# Patient Record
Sex: MALE | Race: BLACK OR AFRICAN AMERICAN | NOT HISPANIC OR LATINO | Employment: FULL TIME | URBAN - METROPOLITAN AREA
[De-identification: names, ages, dates, MRNs, and addresses within clinical notes are randomized per-mention and may not be internally consistent; named-entity substitution may affect disease eponyms.]

---

## 2019-02-02 ENCOUNTER — HOSPITAL ENCOUNTER (EMERGENCY)
Facility: CLINIC | Age: 29
Discharge: HOME | End: 2019-02-02
Admitting: PHYSICIAN ASSISTANT

## 2019-02-02 VITALS
OXYGEN SATURATION: 99 % | HEIGHT: 72 IN | BODY MASS INDEX: 27.77 KG/M2 | RESPIRATION RATE: 16 BRPM | HEART RATE: 103 BPM | TEMPERATURE: 98.4 F | DIASTOLIC BLOOD PRESSURE: 73 MMHG | WEIGHT: 205 LBS | SYSTOLIC BLOOD PRESSURE: 127 MMHG

## 2019-02-02 DIAGNOSIS — B96.89 BACTERIAL CONJUNCTIVITIS OF BOTH EYES: ICD-10-CM

## 2019-02-02 DIAGNOSIS — J06.9 VIRAL URI WITH COUGH: ICD-10-CM

## 2019-02-02 DIAGNOSIS — H10.9 BACTERIAL CONJUNCTIVITIS OF BOTH EYES: ICD-10-CM

## 2019-02-02 LAB
DEPRECATED S PYO AG THROAT QL EIA: NORMAL
SPECIMEN SOURCE: NORMAL

## 2019-02-02 PROCEDURE — 87880 STREP A ASSAY W/OPTIC: CPT | Performed by: PHYSICIAN ASSISTANT

## 2019-02-02 PROCEDURE — 99283 EMERGENCY DEPT VISIT LOW MDM: CPT

## 2019-02-02 PROCEDURE — 25000132 ZZH RX MED GY IP 250 OP 250 PS 637: Performed by: PHYSICIAN ASSISTANT

## 2019-02-02 PROCEDURE — 87081 CULTURE SCREEN ONLY: CPT | Performed by: PHYSICIAN ASSISTANT

## 2019-02-02 RX ORDER — POLYMYXIN B SULFATE AND TRIMETHOPRIM 1; 10000 MG/ML; [USP'U]/ML
1-2 SOLUTION OPHTHALMIC EVERY 6 HOURS
Qty: 3 ML | Refills: 0 | Status: SHIPPED | OUTPATIENT
Start: 2019-02-02 | End: 2019-02-09

## 2019-02-02 RX ORDER — BENZONATATE 100 MG/1
100 CAPSULE ORAL 3 TIMES DAILY PRN
Qty: 20 CAPSULE | Refills: 0 | Status: SHIPPED | OUTPATIENT
Start: 2019-02-02 | End: 2019-02-09

## 2019-02-02 RX ORDER — IBUPROFEN 600 MG/1
600 TABLET, FILM COATED ORAL ONCE
Status: COMPLETED | OUTPATIENT
Start: 2019-02-02 | End: 2019-02-02

## 2019-02-02 RX ORDER — ACETAMINOPHEN 500 MG
1000 TABLET ORAL ONCE
Status: COMPLETED | OUTPATIENT
Start: 2019-02-02 | End: 2019-02-02

## 2019-02-02 RX ADMIN — IBUPROFEN 600 MG: 600 TABLET ORAL at 20:40

## 2019-02-02 RX ADMIN — ACETAMINOPHEN 1000 MG: 500 TABLET, FILM COATED ORAL at 20:40

## 2019-02-02 ASSESSMENT — ENCOUNTER SYMPTOMS
EYE PAIN: 1
EYE REDNESS: 1
SORE THROAT: 1
EYE DISCHARGE: 1
VOICE CHANGE: 1
COUGH: 1
HEADACHES: 1
CHILLS: 1
FEVER: 0

## 2019-02-02 ASSESSMENT — MIFFLIN-ST. JEOR: SCORE: 1947.87

## 2019-02-02 NOTE — LETTER
February 2, 2019      To Whom It May Concern:      Neymar Gilmore was seen in our Emergency Department today, 02/02/19.  I expect his condition to improve over the next 2 days.  He may return to work/school when improved.    Sincerely,        FARIHA Wall PA-C

## 2019-02-02 NOTE — ED AVS SNAPSHOT
[facility] Emergency Department  [address]  [facility] [address]  Phone:  [phone]  Fax:  [phone]                                    Neymar Gilmore   MRN: [id number]    Department:  [facility] Emergency Department   Date of Visit:  2/2/2019           After Visit Summary Signature Page    I have received my discharge instructions, and my questions have been answered. I have discussed any challenges I see with this plan with the nurse or doctor.    ..........................................................................................................................................  Patient/Patient Representative Signature      ..........................................................................................................................................  Patient Representative Print Name and Relationship to Patient    ..................................................               ................................................  Date                                   Time    ..........................................................................................................................................  Reviewed by Signature/Title    ...................................................              ..............................................  Date                                               Time          22EPIC Rev 08/18

## 2019-02-03 NOTE — DISCHARGE INSTRUCTIONS
"Discharge Instructions  Conjunctivitis  Conjunctivitis, or \"pinkeye\", is inflammation of the conjunctiva, which is the thin membrane that lines the inner surface of the eyelids and the whites of the eyes.   There are four main types of conjunctivitis: viral, bacterial, allergic, and non-specific. Both bacterial and viral conjunctivitis spread easily from one person to another by contact with the eye or another person?s hands, by an object the infected person has touched (such as a door handle), or by sharing an object that has touched their eye (such as a towel or pillowcase). Because of this, children with bacterial conjunctivitis cannot go back to school or  until they have been on antibiotics for 24 hours.  Generally, every Emergency Department visit should have a follow-up clinic visit with either a primary or a specialty clinic/provider. Please follow-up as instructed by your emergency provider today.  VIRAL CONJUNCTIVITIS: The virus that causes the common cold and is often seen as part of a general cold typically causes this type of conjunctivitis.  This type of conjunctivitis is not treated with antibiotics, and usually lasts 3 - 5 days.  An over-the-counter antihistamine/decongestant eye drop may help to relieve the itching and irritation of viral conjunctivitis.  BACTERIAL CONJUNCTIVITIS:  This is treated with an antibiotic ointment or eye drop.  In both bacterial and viral conjunctivitis, do not wear contact lenses until your eye is no longer red.   Your contact case should be thrown away and the contacts disinfected overnight, or replaced if disposable.  NON-SPECIFIC CONJUNCTIVITIS: Sometimes a red eye is caused by other things such as dry eye, chemical exposure, or foreign body in the eye such as dust or eyelash.   All of these problems generally improve on their own within 24 hours.  ALLERGIC CONJUNCTIVITIS: These are eye symptoms caused by allergies. This type of conjunctivitis will be treated " with allergy medications.    Return to the Emergency Department if:  If you have blurry vision.  If you have increasing eye pain or drainage.  If you have new redness or swelling in the skin around the eye.  If you were given a prescription for medicine here today, be sure to read all of the information (including the package insert) that comes with your prescription.  This will include important information about the medicine, its side effects, and any warnings that you need to know about.  The pharmacist who fills the prescription can provide more information and answer questions you may have about the medicine.  If you have questions or concerns that the pharmacist cannot address, please call or return to the Emergency Department.   Remember that you can always come back to the Emergency Department if you are not able to see your regular provider in the amount of time listed above, if you get any new symptoms, or if there is anything that worries you.  Discharge Instructions  Upper Respiratory Infection    The upper respiratory tract includes the sinuses, nasal passages, pharynx, and larynx. A URI, or upper respiratory infection, is an infection of any of the parts of the upper airway. Symptoms include runny nose, congestion, sneezing, sore throat, cough, and fever. URIs are almost always caused by a virus. Antibiotics do not help with viral infections, so are generally not prescribed. A URI is very contagious through coughing and nasal secretions; make sure you wash your hands often and clean surfaces after sneezing, coughing or touching them. While you should start to improve in 3 - 5 days, remember that sometimes a cough can linger for several weeks.    Generally, every Emergency Department visit should have a follow-up clinic visit with either a primary or a specialty clinic/provider. Please follow-up as instructed by your emergency provider today.    Return to the Emergency Department if:  Any of your  symptoms get much worse.  You seem very sick, like being too weak to get up.  You have chest pain or shortness of breath.   You have a severe headache.  You are vomiting (throwing up) so much you cannot keep fluids or medicines down.  You have confusion or seem unusually drowsy.  You have a seizure.    What can I do to help myself?  Fill any prescriptions the provider gave you and take them right away  If you have a fever, get plenty of rest and drink lots of fluids, especially water.  Using a humidifier or saline nose spray will also help loosen mucous.   Clothes or blankets will not change your fever. Do what is comfortable for you.  Bathing or sponging in lukewarm water may help you feel better.  Acetaminophen (Tylenol ) or ibuprofen (Advil , Motrin ) will help bring fever down and may help you feel more comfortable. Be sure to read and follow the package directions, and ask your provider if you have questions.  Do not drink alcohol.  Decongestants may help you feel better. You may use decongestant nose sprays Afrin  (oxymetazoline) or Evelio-Synephrine  (phenylephrine hydrochloride) for up to 3 days, or may use a decongestant tablet like Sudafed  (pseudoephedrine).  If you were given a prescription for medicine here today, be sure to read all of the information (including the package insert) that comes with your prescription.  This will include important information about the medicine, its side effects, and any warnings that you need to know about.  The pharmacist who fills the prescription can provide more information and answer questions you may have about the medicine.  If you have questions or concerns that the pharmacist cannot address, please call or return to the Emergency Department.   Remember that you can always come back to the Emergency Department if you are not able to see your regular provider in the amount of time listed above, if you get any new symptoms, or if there is anything that worries  you.

## 2019-02-03 NOTE — ED TRIAGE NOTES
Pt in with C/O URI sx. Pt reports sore throat, itchy red watery  eyes and runny nose. ABCDs intact.

## 2019-02-03 NOTE — ED PROVIDER NOTES
History     Chief Complaint:  URI      HPI   Neymar Gilmore is a 28 year old male who presents to the emergency department for evaluation of a URI. The patient reports that three days ago he had the onset of left eye drainage and redness. The next day, the symptoms spread to both of his eyes and he developed a sore throat, mucous producing cough, intermittent headache, chills, and had lost his voice. He does not wear contact lenses, and denies foreign body sensation.  He reports redness and purulent drainage bilaterally from eyes but does not really have pain in the eyes.  The patient denies fever, ear pain, or use of any medications.  No vomiting, chest pain, shortness of breath, neck stiffness or rashes.  Patient notes that he does smoke cigarettes.    Allergies:  No Known Drug Allergies    Medications:    The patient is not currently taking any prescribed medications.    Past Medical History:    Asthma   Eczema     Past Surgical History:    History reviewed. No pertinent past surgical history.    Family History:    History reviewed. No pertinent family history.    Social History:  Occupation: "[employer]  Current smoker: couple times a week.     Review of Systems   Constitutional: Positive for chills. Negative for fever.   HENT: Positive for sore throat and voice change. Negative for ear pain.    Eyes: Positive for pain, discharge and redness.   Respiratory: Positive for cough.    Neurological: Positive for headaches.   All other systems reviewed and are negative.     Physical Exam     Patient Vitals for the past 24 hrs:   BP Temp Temp src Pulse Resp SpO2 Height Weight   02/02/19 2009 127/73 98.4  F (36.9  C) Oral 103 16 99 % 1.829 m (6') 93 kg (205 lb)     Physical Exam  General: Alert and cooperative with exam. Resting comfortably on gurney  Head:  Scalp is NC/AT  Eyes:  Conjunctiva injected bilaterally with crusting of lashes, PERRL,     EOMI without pain.  ENT:  The external nose and ears are normal.     The  oropharynx is mildly erythematous without tonsillar swelling. Uvula midline, no submandibular swelling, sublingual swelling, trismus.    TM's normal bilaterally  Neck:  Normal range of motion without rigidity.  CV:  Regular rate and rhythm    No pathologic murmur, rubs, or gallops.  Resp:  Breath sounds are clear bilaterally.  No crackles, wheezes, rhonchi.    Non-labored, no retractions or accessory muscle use  GI:  Abdomen is soft, no distension, no tenderness, no masses. No peritoneal signs.    Bowel sounds present in all quadrants  MS:  No lower extremity edema or asymmetric calf swelling.     Skin:  Warm and dry, No rash or lesions noted.  Neuro: Oriented x 3. No gross motor deficits.  Psych:  Awake. Alert. Normal affect. Appropriate interactions.  Lymph: No anterior or posterior cervical lymphadenopathy noted.    Emergency Department Course   Laboratory:  Rapid strep screen: Negative  Beta strep group A culture: In process    Interventions:  2040 Tylenol 1000 mg tablet PO  2040 Advil 600 mg tablet PO    Emergency Department Course:  2032 Nursing notes and vitals reviewed. I performed an exam of the patient as documented above.     Medicine administered as documented above. Throat swabbed. This was sent to the lab for further testing, results above.    2054 I rechecked the patient and discussed the results of his workup thus far.     Findings and plan explained to the Patient. Patient discharged home with instructions regarding supportive care, medications, and reasons to return. The importance of close follow-up was reviewed. The patient was prescribed Tessalon and Polytrim.    I personally reviewed the laboratory results with the Patient and answered all related questions prior to discharge.     Impression & Plan    Medical Decision Making:  Neymar Gilmore is a 28 year old male who presents for evaluation of cough, sore throat, and eye redness and drainage.  This is consistent with an upper respiratory tract  infection, with evidence of bilateral conjunctivitis.  Suspect bacterial conjunctivitis likely given purulent drainage.  No clinical evidence of of more serious etiology such as orbital or periorbital cellulitis, acute glaucoma, keratitis, ulcer, foreign body etc.  Patient is not a contact lens wearer.  There is no signs at this point of serious bacterial infection such as OM, RPA, epiglottitis, PTA, strep pharyngitis, pneumonia, sinusitis, meningitis, bacteremia, serious bacterial infection.  Rapid strep negative.  Given clear lungs, fever, no hypoxia and no respiratory distress I do not feel he needs a CXR at this point as the probability of bacterial pneumonia is very unlikely.  There are no gastrointestinal symptoms at this point and no signs of dehydration.  Close follow up with primary care physician is indicated.  Return to ED for fever > 102, protracted vomiting, confusion.      Diagnosis:    ICD-10-CM    1. Bacterial conjunctivitis of both eyes H10.9    2. Viral URI with cough J06.9     B97.89        Disposition:  Discharged to home    Discharge Medications:     Medication List      Started    benzonatate 100 MG capsule  Commonly known as:  TESSALON  100 mg, Oral, 3 TIMES DAILY PRN     trimethoprim-polymyxin b 34061-2.1 UNIT/ML-% ophthalmic solution  Commonly known as:  POLYTRIM  1-2 drops, Both Eyes, EVERY 6 HOURS          Scribe Disclosure:  I, Omar Landis, am serving as a scribe on 2/2/2019 at 8:32 PM to personally document services performed by Joshua Mao PA-C based on my observations and the provider's statements to me.     Omar Landis  2/2/2019   [facility] EMERGENCY DEPARTMENT       Joshua Mao PA-C  02/02/19 0867

## 2019-02-04 LAB
BACTERIA SPEC CULT: NORMAL
Lab: NORMAL
SPECIMEN SOURCE: NORMAL

## 2019-02-04 NOTE — RESULT ENCOUNTER NOTE
Final Beta strep group A r/o culture is NEGATIVE for Group A streptococcus.    No treatment or change in treatment per [facility] Strep protocol.

## 2019-08-29 ENCOUNTER — HOSPITAL ENCOUNTER (EMERGENCY)
Facility: CLINIC | Age: 29
Discharge: HOME | End: 2019-08-29
Attending: EMERGENCY MEDICINE | Admitting: EMERGENCY MEDICINE
Payer: MEDICAID

## 2019-08-29 VITALS
OXYGEN SATURATION: 97 % | BODY MASS INDEX: 30.2 KG/M2 | WEIGHT: 222.66 LBS | DIASTOLIC BLOOD PRESSURE: 68 MMHG | RESPIRATION RATE: 20 BRPM | TEMPERATURE: 98.1 F | SYSTOLIC BLOOD PRESSURE: 123 MMHG

## 2019-08-29 DIAGNOSIS — J45.40 MODERATE PERSISTENT ASTHMA, UNSPECIFIED WHETHER COMPLICATED: ICD-10-CM

## 2019-08-29 PROCEDURE — 99282 EMERGENCY DEPT VISIT SF MDM: CPT

## 2019-08-29 RX ORDER — ALBUTEROL SULFATE 90 UG/1
2 AEROSOL, METERED RESPIRATORY (INHALATION) EVERY 4 HOURS PRN
Qty: 1 INHALER | Refills: 0 | Status: SHIPPED | OUTPATIENT
Start: 2019-08-29 | End: 2022-03-21

## 2019-08-29 RX ORDER — INHALER, ASSIST DEVICES
SPACER (EA) MISCELLANEOUS
Qty: 1 EACH | Refills: 0 | Status: SHIPPED | OUTPATIENT
Start: 2019-08-29 | End: 2022-03-21

## 2019-08-29 RX ORDER — PREDNISONE 20 MG/1
TABLET ORAL
Qty: 10 TABLET | Refills: 0 | Status: SHIPPED | OUTPATIENT
Start: 2019-08-29 | End: 2022-03-21

## 2019-08-29 SDOH — HEALTH STABILITY: MENTAL HEALTH: HOW OFTEN DO YOU HAVE A DRINK CONTAINING ALCOHOL?: NEVER

## 2019-08-29 ASSESSMENT — ENCOUNTER SYMPTOMS
FEVER: 0
COUGH: 0
SHORTNESS OF BREATH: 1
WHEEZING: 1

## 2019-08-29 NOTE — LETTER
August 29, 2019      To Whom It May Concern:      Neymar Gilmore was seen in our Emergency Department today, 08/29/19.  I expect his condition to improve over the next 2 days.  He may return to work/school when improved.    Sincerely,        Meggan Luna MD

## 2019-08-29 NOTE — DISCHARGE INSTRUCTIONS
Discharge Instructions  Asthma    Asthma is a condition causing narrowing and inflammation of the airways that can make it hard to breathe.  Asthma can also cause cough, wheezing, noisy breathing and tightness in the chest.  Asthma can be brought on or  triggered  by many things, including dust, mold, pollen, cigarette smoke, exercise, stress and infections (like the common cold).     Generally, every Emergency Department visit should have a follow-up clinic visit with either a primary or a specialty clinic/provider. Please follow-up as instructed by your emergency provider today.    Return to the Emergency Department if:  Your breathing gets worse.  You need to use your inhaler more often than every 4 hours, or cannot get relief from your inhaler.  You are very weak, or feel much more ill.  You develop new symptoms, such as chest pain.  You cough up blood.  You are vomiting (throwing up) enough that you cannot keep fluids or your medicine down.    What can I do to help myself?  Fill any prescriptions the provider gave you and take them right away--especially antibiotics. Be sure to finish the whole antibiotic prescription.  You may be given a prescription for an inhaler, which can help loosen tight air passages.  Use this as needed, but not more often than directed. Inhalers work much better when used with a spacer.   You may be given a prescription for a steroid to reduce inflammation. Used long-term, these can have some side effects, but for short-term use they are safe. You may notice restlessness or increased appetite (eating more).      You may use non-prescription cough or cold medicines. Cough medicines may help, but do not make the cough go away completely.   Avoid smoke, because this can make you feel worse. If you smoke, this may be a good time to quit! Consider using nicotine lozenges, gum, or patches to reduce cravings.   If you have a fever, Tylenol  (acetaminophen), Motrin  (ibuprofen), or Advil   (ibuprofen), may help bring fever down and may help you feel more comfortable. Be sure to read and follow the package directions, and ask your provider if you have questions.  Be sure to get your flu shot each year.  For certain age groups, the pneumonia shot can help prevent pneumonia.  It is important that you follow up with your regular provider, to be sure that you are improving from this spell (an acute asthma exacerbation), and also to do what you can to keep from having trouble again. Sometimes you need long-term medicines to keep your asthma under control.   If you were given a prescription for medicine here today, be sure to read all of the information (including the package insert) that comes with your prescription.  This will include important information about the medicine, its side effects, and any warnings that you need to know about.  The pharmacist who fills the prescription can provide more information and answer questions you may have about the medicine.  If you have questions or concerns that the pharmacist cannot address, please call or return to the Emergency Department.   Remember that you can always come back to the Emergency Department if you are not able to see your regular provider in the amount of time listed above, if you get any new symptoms, or if there is anything that worries you.

## 2019-08-29 NOTE — ED NOTES
Pt denies feeling wheezy and denies pain/SOB at the moment, but says that his supervisor instructed him to be evaluated because he becomes dyspneic at work.

## 2019-08-29 NOTE — ED AVS SNAPSHOT
[facility] Emergency Department  [address]  [facility] [address]  Phone:  [phone]  Fax:  [phone]                                    Neymar Gilmore   MRN: [id number]    Department:  [facility] Emergency Department   Date of Visit:  8/29/2019           After Visit Summary Signature Page    I have received my discharge instructions, and my questions have been answered. I have discussed any challenges I see with this plan with the nurse or doctor.    ..........................................................................................................................................  Patient/Patient Representative Signature      ..........................................................................................................................................  Patient Representative Print Name and Relationship to Patient    ..................................................               ................................................  Date                                   Time    ..........................................................................................................................................  Reviewed by Signature/Title    ...................................................              ..............................................  Date                                               Time          22EPIC Rev 08/18

## 2019-08-29 NOTE — ED PROVIDER NOTES
"  History     Chief Complaint:  Shortness of Breath      HPI   Neymar Gilmore is a 28 year old male with a history of asthma who presents with shortness of breath. The patient says that he has been having some wheezing at night and while at work. 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 The patient notes that the wheezing happens more often at night than at work and it can get bad enough that it's \"basically a whistle\". The patient says that he has not had an inhaler to treat his asthma in years. He denies any fever, cough or congestion. The patient says that he is feeling fine upon examination.       Allergies:  No Known Drug Allergies     Medications:    Medications reviewed. No pertinent medications.     Past Medical History:    GERD  Asthma  Throat irritation  Conjunctivitis   Cellulitis  Exercise induced bronchospasm  Eczema     Past Surgical History:    Surgical history reviewed. No pertinent surgical history.     Family History:    Family history reviewed. No pertinent family history.     Social History:  Smoking Status: Former Smoker  Alcohol Use: Negative  Drug Use: Negative  Marital Status:  Single     Review of Systems   Constitutional: Negative for fever.   HENT: Negative for congestion.    Respiratory: Positive for shortness of breath and wheezing. Negative for cough.    All other systems reviewed and are negative.        Physical Exam     Patient Vitals for the past 24 hrs:   BP Temp Temp src Heart Rate Resp SpO2 Weight   08/29/19 0341 123/68 98.1  F (36.7  C) Oral 89 20 97 % 101 kg (222 lb 10.6 oz)        Physical Exam  Gen: alert  HEENT: PERRL, oropharynx clear  Neck: normal ROM  CV: RRR, no murmurs, 2+ distal pulses in all 4 extremities  Chest: no tenderness over the chest wall  Pulm: breath sounds equal, lungs clear. slightly prolonged aspiratory phase  Abd: Soft, nontender  Back: no evidence of injury  MSK: no lower extremity edema, no calf tenderness  Skin: no rash  Neuro: alert, " appropriate conversation and interaction       Emergency Department Course     Emergency Department Course:    0357 Nursing notes and vitals reviewed.    0358 I performed an exam of the patient as documented above.     0412 The patient is discharged to home.       Impression & Plan      Medical Decision Making:  Neymar Gilmore is a 28 year old male  who presents for evaluation of shortness of breath and wheezing as detailed above. Signs and symptoms are consistent with an asthma exacerbation. A broad differential was considered including foreign body, asthma, pneumonia, bronchitis, reactive airway disease, pneumothorax, cardiac equivalent, viral induced wheezing, allergic phenomena, etc. Symptoms consistent with recurrence of previously diagnosed asthma. There are no signs at this point of any serious etiologies including those mentioned above. No indication for hospitalization at this time including no hypoxia, no marked increase in respiratory rate, minimal to no retractions. Supportive outpatient management is indicated, medications for discharge noted below. Close followup with primary care physician. Return for increased wheezing, progressive shortness of breath, develops fever.      Diagnosis:    ICD-10-CM    1. Moderate persistent asthma, unspecified whether complicated J45.40      Disposition:   Findings and plan explained to the Patient. Patient discharged home with instructions regarding supportive care, medications, and reasons to return. The importance of close follow-up was reviewed.     Discharge Medications:     Review of your medicines      START taking      Dose / Directions   albuterol 108 (90 Base) MCG/ACT inhaler  Commonly known as:  PROAIR HFA      Dose:  2 puff  Inhale 2 puffs into the lungs every 4 hours as needed for shortness of breath / dyspnea  Quantity:  1 Inhaler  Refills:  0     predniSONE 20 MG tablet  Commonly known as:  DELTASONE      Take two tablets (= 40mg) each day for 5 (five)  days  Quantity:  10 tablet  Refills:  0     spacer holding chamber      Spacer- use with albuterol inhaler  Quantity:  1 each  Refills:  0           Where to get your medicines      Some of these will need a paper prescription and others can be bought over the counter. Ask your nurse if you have questions.    Bring a paper prescription for each of these medications    albuterol 108 (90 Base) MCG/ACT inhaler    predniSONE 20 MG tablet    spacer holding chamber         Scribe Disclosure:  I, Isak Box, am serving as a scribe at 3:57 AM on 8/29/2019 to document services personally performed by Meggan Luna based on my observations and the provider's statements to me.      [facility] EMERGENCY DEPARTMENT       Meggan Luna MD  08/29/19 [id number]

## 2020-03-16 ENCOUNTER — HOSPITAL ENCOUNTER (EMERGENCY)
Facility: CLINIC | Age: 30
Discharge: HOME | End: 2020-03-16
Attending: EMERGENCY MEDICINE | Admitting: EMERGENCY MEDICINE
Payer: COMMERCIAL

## 2020-03-16 VITALS
DIASTOLIC BLOOD PRESSURE: 90 MMHG | RESPIRATION RATE: 18 BRPM | OXYGEN SATURATION: 98 % | SYSTOLIC BLOOD PRESSURE: 142 MMHG | TEMPERATURE: 98.3 F

## 2020-03-16 DIAGNOSIS — K21.9 GASTROESOPHAGEAL REFLUX DISEASE, ESOPHAGITIS PRESENCE NOT SPECIFIED: ICD-10-CM

## 2020-03-16 PROCEDURE — 99283 EMERGENCY DEPT VISIT LOW MDM: CPT

## 2020-03-16 PROCEDURE — 25000132 ZZH RX MED GY IP 250 OP 250 PS 637: Performed by: EMERGENCY MEDICINE

## 2020-03-16 PROCEDURE — 25000125 ZZHC RX 250: Performed by: EMERGENCY MEDICINE

## 2020-03-16 RX ORDER — FAMOTIDINE 20 MG/1
20 TABLET, FILM COATED ORAL ONCE
Status: COMPLETED | OUTPATIENT
Start: 2020-03-16 | End: 2020-03-16

## 2020-03-16 RX ADMIN — LIDOCAINE HYDROCHLORIDE 30 ML: 20 SOLUTION ORAL; TOPICAL at 02:38

## 2020-03-16 RX ADMIN — OMEPRAZOLE 20 MG: 20 CAPSULE, DELAYED RELEASE ORAL at 02:37

## 2020-03-16 RX ADMIN — FAMOTIDINE 20 MG: 20 TABLET ORAL at 02:37

## 2020-03-16 ASSESSMENT — ENCOUNTER SYMPTOMS
SORE THROAT: 1
ROS GI COMMENTS: HEART BURN
FEVER: 0
DIFFICULTY URINATING: 0
COUGH: 0

## 2020-03-16 NOTE — LETTER
March 16, 2020      To Whom It May Concern:      Neymar Gilmore was seen in our Emergency Department today, 03/16/20.  I expect his condition to improve over the next 1 days.  He may return to work/school when improved.    Sincerely,        Rolo Gamboa MD

## 2020-03-16 NOTE — ED TRIAGE NOTES
Pt states increased acid reflux for 2 weeks. States was on an unknown proton pump inhibitor but has been out for over 2 weeks. ABCs intact GCS 15

## 2020-03-16 NOTE — ED PROVIDER NOTES
History     Chief Complaint:  Heartburn       HPI   Neymar Gilmore is a 29 year old male with a history of GERD who presents with heartburn. The patient reports having his typical acid reflux discomfort and sore throat for the past 2 weeks. The patient does eat spicy foods and was taking omeprazole for his GERD, but he has been out of the omeprazole for the past 2 weeks. 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 He denies any fever, cough, chest pain, or difficulty urinating.   Symptoms constant.  No modifying factors.  No abdominal pain or epigastric pain.  No known ulcer history.    Allergies:  No Known Allergies     Medications:    Medications reviewed. No current medications.      Past Medical History:    Gastroesophageal reflux disease   Uncomplicated asthma     Past Surgical History:    Surgical history reviewed. No pertinent surgical history.    Family History:    Family history reviewed. No pertinent family history.      Social History:  Smoking Status: Never Smoker  Alcohol Use: no  PCP: Patel, [facility]     Review of Systems   Constitutional: Negative for fever.   HENT: Positive for sore throat.    Respiratory: Negative for cough.    Cardiovascular: Negative for chest pain.   Gastrointestinal:        Heart burn   Genitourinary: Negative for difficulty urinating.   All other systems reviewed and are negative.      Physical Exam     Patient Vitals for the past 24 hrs:   BP Temp Temp src Heart Rate Resp SpO2   03/16/20 0222 (!) 142/90 98.3  F (36.8  C) Temporal 71 18 98 %        Physical Exam  I have reviewed the triage vital signs    Constitutional: Appears stated age  Eyes: No discharge, symmetrical lids  ENT: Moist mucous membranes, no ear discharge  Neck: Full range of motion  Respiratory: CTAB, no wheezes  Cardiovascular: Regular rate and rhythm, no lower extremity edema  Chest: Equal rise  Gastrointestinal: Soft. Nondistended. NTTP. No rebound or  guarding  Musculoskeletal: No gross deformities.   Skin: Warm and well perfused. No visible rash.  Neurologic: Moves all extremities, speech fluent without dysarthria  Psychiatric: Appropriate affect, alert and interactive     Emergency Department Course     Interventions:  0238 GI cocktail 30 mL oral   0237 pepcid 20 mg oral   0237 Omeprazole 20 mg oral     Emergency Department Course:  Past medical records, nursing notes, and vitals reviewed.    0225 I performed an exam of the patient as documented above.      Findings and plan explained to the Patient. Patient discharged home with instructions regarding supportive care, medications, and reasons to return. The importance of close follow-up was reviewed. The patient was prescribed omeprazole.        Impression & Plan     Medical Decision Making:  Neymar Gilmore is a 29 year old male who presents to the emergency department today with symptoms of GERD.  Differential includes GERD, gastritis, esophagitis, PUD.  Initially ordered testing to assess for hepatitis or pancreatitis, however on discussion with patient and lack of abdominal pain, these were canceled at patient request.  Do not feel is unreasonable.  Vital signs and abdominal exam are highly reassuring as above.  No reason to suspect surgical or other emergent etiology of symptoms at this time.  Patient symptoms were controlled as above.  Will prescribe PPI as outpatient.  Advised close follow-up with PCP.  Strict return to ED precautions were given.      Discharge Diagnosis:    ICD-10-CM    1. Gastroesophageal reflux disease, esophagitis presence not specified  K21.9        Disposition:  The patient is discharged to home.    Discharge Medications:  New Prescriptions    OMEPRAZOLE (PRILOSEC) 20 MG DR CAPSULE    Take 1 capsule (20 mg) by mouth daily       Scribe Disclosure:  Cj HINSON, am serving as a scribe at 2:25 AM on 3/16/2020 to document services personally performed by Rolo Gamboa MD based on my  observations and the provider's statements to me.      3/16/2020   Rolo Gamboa MD Vo, Timothy Le, MD  03/16/20 0303

## 2020-03-18 ENCOUNTER — HOSPITAL ENCOUNTER (EMERGENCY)
Facility: CLINIC | Age: 30
Discharge: HOME | End: 2020-03-18
Attending: EMERGENCY MEDICINE | Admitting: EMERGENCY MEDICINE
Payer: COMMERCIAL

## 2020-03-18 VITALS
SYSTOLIC BLOOD PRESSURE: 127 MMHG | TEMPERATURE: 97.5 F | HEART RATE: 83 BPM | RESPIRATION RATE: 16 BRPM | BODY MASS INDEX: 28.48 KG/M2 | OXYGEN SATURATION: 99 % | WEIGHT: 210 LBS | DIASTOLIC BLOOD PRESSURE: 76 MMHG

## 2020-03-18 DIAGNOSIS — R51.9 NONINTRACTABLE HEADACHE, UNSPECIFIED CHRONICITY PATTERN, UNSPECIFIED HEADACHE TYPE: ICD-10-CM

## 2020-03-18 PROCEDURE — 99283 EMERGENCY DEPT VISIT LOW MDM: CPT

## 2020-03-18 PROCEDURE — 25000132 ZZH RX MED GY IP 250 OP 250 PS 637: Performed by: EMERGENCY MEDICINE

## 2020-03-18 RX ORDER — ACETAMINOPHEN 500 MG
1000 TABLET ORAL ONCE
Status: COMPLETED | OUTPATIENT
Start: 2020-03-18 | End: 2020-03-18

## 2020-03-18 RX ADMIN — ACETAMINOPHEN 975 MG: 325 TABLET, FILM COATED ORAL at 16:57

## 2020-03-18 ASSESSMENT — ENCOUNTER SYMPTOMS
NUMBNESS: 0
FEVER: 0
HEADACHES: 1
WEAKNESS: 0
NAUSEA: 1
VOMITING: 1
PHOTOPHOBIA: 1

## 2020-03-18 NOTE — DISCHARGE INSTRUCTIONS
Patient to drink lots of fluids.  Drink enough fluids until your urine is clear in color.    Please for the next 1 to 2 days, take a dose of Tylenol every 8 hours.  This may help stay in front of your headache.  Please avoid NSAIDs as this may exacerbate your heartburn/reflux.  The daily recommended dose of Tylenol is not to exceed 3000 mg/day.    Follow-up with your primary care provider in 2 to 3 days for recheck.    Return to ER with severe headache, vomiting, neurologic symptoms or any other concerns.    Discharge Instructions  Headache    You were seen today for a headache. Headaches may be caused by many different things such as muscle tension, sinus inflammation, anxiety and stress, having too little sleep, too much alcohol, some medical conditions or injury. You may have a migraine, which is caused by changes in the blood vessels in your head.  At this time your provider does not find that your headache is a sign of anything dangerous or life-threatening.  However, sometimes the signs of serious illness do not show up right away.      Generally, every Emergency Department visit should have a follow-up clinic visit with either a primary or a specialty clinic/provider. Please follow-up as instructed by your emergency provider today.    Return to the Emergency Department if:  You get a new fever of 100.4 F or higher.  Your headache gets much worse.  You get a stiff neck with your headache.  You get a new headache that is significantly different or worse than headaches you have had before.  You are vomiting (throwing up) and cannot keep food or water down.  You have blurry or double vision or other problems with your eyes.  You have a new weakness on one side of your body.  You have difficulty with balance which is new.  You or your family thinks you are confused.  You have a seizure.    What can I do to help myself?  Pain medications - You may take a pain medication such as Tylenol  (acetaminophen), Advil ,  Motrin  (ibuprofen) or Aleve  (naproxen).  Take a pain reliever as soon as you notice symptoms.  Starting medications as soon as you start to have symptoms may lessen the amount of pain you have.  Relaxing in a quiet, dark room may help.  Get enough sleep and eat meals regularly.  You may need to watch for certain foods or other things which may trigger your headaches.  Keeping a journal of your headaches and possible triggers may help you and your primary provider to identify things which you should avoid which may be causing your headaches.  If you were given a prescription for medicine here today, be sure to read all of the information (including the package insert) that comes with your prescription.  This will include important information about the medicine, its side effects, and any warnings that you need to know about.  The pharmacist who fills the prescription can provide more information and answer questions you may have about the medicine.  If you have questions or concerns that the pharmacist cannot address, please call or return to the Emergency Department.   Remember that you can always come back to the Emergency Department if you are not able to see your regular provider in the amount of time listed above, if you get any new symptoms, or if there is anything that worries you.

## 2020-03-18 NOTE — ED AVS SNAPSHOT
[facility] Emergency Department  [address]  [facility] [address]  Phone:  [phone]  Fax:  [phone]                                    Neymar Gilmore   MRN: [id number]    Department:  [facility] Emergency Department   Date of Visit:  3/18/2020           After Visit Summary Signature Page    I have received my discharge instructions, and my questions have been answered. I have discussed any challenges I see with this plan with the nurse or doctor.    ..........................................................................................................................................  Patient/Patient Representative Signature      ..........................................................................................................................................  Patient Representative Print Name and Relationship to Patient    ..................................................               ................................................  Date                                   Time    ..........................................................................................................................................  Reviewed by Signature/Title    ...................................................              ..............................................  Date                                               Time          22EPIC Rev 08/18

## 2020-03-18 NOTE — LETTER
March 18, 2020      To Whom It May Concern:      Neymar Gilmore was seen in our Emergency Department today, 03/18/20.  I expect his condition to improve over the next 2 days.  He may return to work/school when improved.    Sincerely,        Kathy Sheets RN

## 2020-03-18 NOTE — ED TRIAGE NOTES
Headache and no sleep for 2 days. Has tried aleve, tylenol, ibuprofen with no relief. Has been nauseated in the past but not currently. Sensitivity to light and sound.

## 2020-03-18 NOTE — ED PROVIDER NOTES
History     Chief Complaint:  Headache    HPI   Neymar Gilmore is a 29 year old male who presents with a headache. The patient states that he has had a headache for the past 3 days. He states that the headache is frontal and behind his eyes. 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 The patient states that he is new to working overnights and needs to care for his children during the day. He states that he is not getting a lot of sleep. He also states that he is not able to sleep well due to pain.The patient states that he has had some nausea, noise and light sensitivity. He also states that his urine is quite dark and notes he has not been doing his best at staying hydrated. The patient denies any numbness, weakness or fever. The patient does not have a history of migraines but notes that his mother does. The patient has been taking Advil and naproxen for pain every 8 hours without much relief. He notes that he was recently seen on 3/16 for GERD and one episode of vomiting. The patient was given Prilosec at this time and notes relief with this medication.     Allergies:  No Known Allergies     Medications:    Albuterol   Prilosec  Prednisone    Past Medical History:    GERD  Asthma     Past Surgical History:    The patient does not have any pertinent past surgical history.    Family History:    No past pertinent family history.  No family hx of cerebral aneurysm    Social History:  Smoking Status: Never Smoker  Smokeless Tobacco: Never Used  Alcohol Use: Never   Marital Status:  Single     Review of Systems   Constitutional: Negative for fever.   Eyes: Positive for photophobia.   Gastrointestinal: Positive for nausea and vomiting.   Neurological: Positive for headaches. Negative for weakness and numbness.   All other systems reviewed and are  negative.      Physical Exam     Patient Vitals for the past 24 hrs:   BP Temp Temp src Pulse Resp SpO2 Weight   03/18/20 1549 127/76 97.5  F (36.4  C) Oral 83 16 99 % 95.3 kg (210 lb)       Physical Exam  General:   Well-nourished   Speaking in full sentences  Eyes:   Conjunctiva without injection or scleral icterus   Pupils 3 mm bilaterally, reactive  ENT:   Moist mucous membranes   Nares patent   Pinnae normal    Tenderness to palpation over bilateral temples.  Neck:   Full ROM   No stiffness appreciated  Resp:   Lungs CTAB   No crackles, wheezing or audible rubs   Good air movement  CV:    Normal rate, regular rhythm   S1 and S2 present   No murmur, gallop or rub  GI:   BS present   Abdomen soft without distention   Non-tender to light and deep palpation   No guarding or rebound tenderness  Skin:   Warm, dry, well perfused   No rashes or open wounds on exposed skin  MSK:   Moves all extremities   No focal deformities or swelling  Neuro:   Alert   CN III-XII grossly intact   Answers questions appropriately   Moves all extremities equally   Gait stable  Psych:   Normal affect, normal mood    Emergency Department Course     Interventions:  1657 Tylenol 975 mg oral     Emergency Department Course:     Nursing notes and vitals reviewed.    1640 I performed an exam of the patient as documented above. I answered all questions prior to discharge.     Impression & Plan      Medical Decision Making:  Neymar Gilmore is a 29 year old male who presents to the emergency department today for evaluation of a headache.  VS on presentation are WNL.  Differential diagnosis is broad, including but not limited to tension, migraine, cluster headache, subarachnoid hemorrhage, bacterial meningitis/encephalitis, temporal arteritis, tumor/mass, CO poisoning, subdural/epidural hematoma, angle closure glaucoma, and hypertensive emergency.    Presently, sx most likely 2/2 primary headache syndrome.  Pt notes bitemporal and bilateral pain,  described almost as a vice  around his head.  Factors of poor sleep hygiene, work stress, poor hydration contributing.  No hx of head trauma.  No fevers, chills, neck stiffness, or antecedent URI sx to suggest meningitis/encephalitis.  No sudden onset, thunderclap headache suggestive of SAH.  No focal neurologic deficits suggestive of TIA/CVA.  Hx and exam less consistent with migraine (bilateral), though he does note photophobia / phonophobia.  Discussed treatment strategies moving forward including scheduled APAP over next 2 days (discussed max dose of 3000 mg/24 hrs), avoidance of NSAIDS given recent visit for reflux, ensuring adequate hydration, and sleep hygiene.  Pt felt comfortable with this plan of care.  No indication for imaging or further lab work-up at this time.  Pt will return to ER with worsening HA, vomiting, visual changes, fever, new neurologic deficits, or any other concerns.  All questions were answered prior to DC.    Diagnosis:    ICD-10-CM    1. Nonintractable headache, unspecified chronicity pattern, unspecified headache type  R51      Disposition:   Findings and plan explained to the Patient. Patient discharged home with instructions regarding supportive care, medications, and reasons to return. The importance of close follow-up was reviewed.     Scribe Disclosure:  I, Viktoria Ayala, am serving as a scribe at 4:26 PM on 3/18/2020 to document services personally performed by Dean Bess MD based on my observations and the provider's statements to me.    [facility] EMERGENCY DEPARTMENT       Dean Bess MD  03/18/20 2053

## 2022-01-30 ENCOUNTER — HOSPITAL ENCOUNTER (EMERGENCY)
Facility: CLINIC | Age: 32
Discharge: HOME | End: 2022-01-30
Attending: NURSE PRACTITIONER | Admitting: NURSE PRACTITIONER
Payer: OTHER MISCELLANEOUS

## 2022-01-30 VITALS
TEMPERATURE: 98.1 F | HEART RATE: 78 BPM | RESPIRATION RATE: 20 BRPM | SYSTOLIC BLOOD PRESSURE: 137 MMHG | OXYGEN SATURATION: 99 % | DIASTOLIC BLOOD PRESSURE: 86 MMHG

## 2022-01-30 DIAGNOSIS — S51.811A LACERATION OF RIGHT FOREARM, INITIAL ENCOUNTER: ICD-10-CM

## 2022-01-30 PROCEDURE — 12001 RPR S/N/AX/GEN/TRNK 2.5CM/<: CPT

## 2022-01-30 PROCEDURE — 250N000009 HC RX 250: Performed by: NURSE PRACTITIONER

## 2022-01-30 PROCEDURE — 99283 EMERGENCY DEPT VISIT LOW MDM: CPT

## 2022-01-30 RX ADMIN — Medication: at 22:19

## 2022-01-30 ASSESSMENT — ENCOUNTER SYMPTOMS
NUMBNESS: 0
FEVER: 0
WEAKNESS: 0
COLOR CHANGE: 0
WOUND: 1

## 2022-01-31 NOTE — ED TRIAGE NOTES
"Pt states laceration to posterior forearm that he sustained while at work tonight. Pt states arm because caught on \"safety thing\" seperating grill and fryer. ABCs intact GCS 15  "

## 2022-01-31 NOTE — ED PROVIDER NOTES
History     Chief Complaint:  Laceration      HPI   Neymar Gilmore is a 31 year old male who presents with laceration to the right forearm.  This occurred at work with a protective metal piece of equipment.  Patient has no other concerns or complaints.  Is unsure of his tetanus status.    Review of Systems   Constitutional: Negative for fever.   Skin: Positive for wound. Negative for color change.   Neurological: Negative for weakness and numbness.   All other systems reviewed and are negative.    Allergies:  Other Environmental Allergy      Medications:    albuterol (PROAIR HFA) 108 (90 Base) MCG/ACT inhaler  predniSONE (DELTASONE) 20 MG tablet  spacer (OPTICHAMBER VICKI) holding chamber        Past Medical History:      Past Medical History:   Diagnosis Date     Gastroesophageal reflux disease      Uncomplicated asthma      There are no problems to display for this patient.       Past Surgical History:    No past surgical history on file.    Family History:    No family history on file.    Social History:  Presents alone  Works    Physical Exam     Patient Vitals for the past 24 hrs:   BP Temp Temp src Pulse Resp SpO2   01/30/22 2058 137/86 98.1  F (36.7  C) Oral 78 20 99 %       Physical Exam  General: Alert, No obvious discomfort, well kept   HENT:  Normal voice, No lymphadenopathy  Eyes:  The pupils are equal, round, and reactive to light, Conjunctiva normal, No scleral icterus   Neck:  Normal range of motion  CV:  Normal Pulses, Normal cap refill  Resp:  Non-labored, No cough  MS:  2 cm laceration of right forearm, No indication for Flexor or extensor tendon injury, Normal ROM if all digits  Skin:  No rash or acute skin lesions noted  Neuro:  Speech is normal and fluent, Normal sensation  Psych: Awake. Alert.  Normal affect.  Appropriate interactions. Good eye contact      Emergency Department Course       Imaging:  No orders to display       Laboratory:  Labs Ordered and Resulted from Time of ED  Arrival to Time of ED Departure - No data to display    Procedures:  [facility] Procedure Note        Laceration Repair:    Performed by: ROSINA Franklin CNP  Authorized by: ROSINA Franklin CNP  Consent given by: Patient who states understanding of the procedure being performed after discussing the risks, benefits and alternatives.    Preparation: Patient was prepped and draped in usual sterile fashion.  Irrigation solution: saline    Body area: Right forearm  Laceration length: 2cm  Contamination: The wound is not contaminated.  Foreign bodies:none  Tendon involvement: none  Anesthesia: Topical (patient has a needle phobia and requested topical anesthesia)  Local anesthetic: LET    Debridement: none  Skin closure: Closed with 5 x 4.0 Ethilon  Technique: interrupted  Approximation: close  Approximation difficulty: simple    Patient tolerance: Patient tolerated the procedure well with no immediate complications.      Emergency Department Course:    Reviewed:  I reviewed nursing notes, vitals and past history    Assessments:   I obtained history and examined the patient as noted above and explained findings.       Interventions:  Medications   Tdap (tetanus-diphtheria-acell pertussis) (ADACEL) injection 0.5 mL (has no administration in time range)   lido-EPINEPHrine-tetracaine (LET) topical gel GEL ( Topical Given 1/30/22 2219)       Disposition:  The patient was discharged to home.    Impression & Plan      Medical Decision Making:  Findings and exam were consistent with uncomplicated laceration of right forearm. The wound was carefully evaluated and explored. Was repaired as noted above. There is no evidence at this time of bony injury, foreign body, deep space infection, or neurovascular injury. Follow up in 2-3 days time if concerns over healing. Possible complications (infection, scarring) were reviewed with the patient. The patient is to follow-up for suture removal in 10-14 days.  Indications for immediate return to ER/UR were reviewed and included but are not limited to, redness, fevers, drainage, increasing pain, high fevers, or other concerns.  Tetanus updated    Covid-19  Neymar Gilmore was evaluated during a global COVID-19 pandemic, which necessitated consideration that the patient might be at risk for infection with the SARS-CoV-2 virus that causes COVID-19.   Applicable protocols for evaluation were followed during the patient's care.   COVID-19 was considered as part of the patient's evaluation. The plan for testing is: Not indicated    Diagnosis:    ICD-10-CM    1. Laceration of right forearm, initial encounter  S51.811A        Discharge Medications:  Discharge Medication List as of 1/30/2022 10:09 PM                 Karthikeyan Moore, ROSINA CNP  01/30/22 2224

## 2022-02-10 ENCOUNTER — HOSPITAL ENCOUNTER (EMERGENCY)
Facility: CLINIC | Age: 32
Discharge: HOME | End: 2022-02-10
Attending: EMERGENCY MEDICINE | Admitting: EMERGENCY MEDICINE
Payer: COMMERCIAL

## 2022-02-10 VITALS
TEMPERATURE: 97.7 F | SYSTOLIC BLOOD PRESSURE: 124 MMHG | DIASTOLIC BLOOD PRESSURE: 81 MMHG | OXYGEN SATURATION: 99 % | HEART RATE: 63 BPM | RESPIRATION RATE: 18 BRPM

## 2022-02-10 DIAGNOSIS — Z48.02 VISIT FOR SUTURE REMOVAL: ICD-10-CM

## 2022-02-10 PROCEDURE — 999N000104 HC STATISTIC NO CHARGE

## 2022-02-10 NOTE — ED PROVIDER NOTES
History   Chief Complaint:  Suture Removal       The history is provided by the patient.      Neymar Gilmore is a 31 year old male who presents for suture removal. The patient was recently seen here on 01/30/2021 after lacerating his left forearm. He has no other concerns to report today.     Review of Systems  Positive - laceration to R forearm  Negative-redness, warmth, tenderness    Allergies:  No Known Drug Allergies    Medications:  Albuterol inhaler  Omeprazole    Past Medical History:     Asthma  GERD      Social History:  The patient was accompanied to the ED by his daughters.  Marital status:     Physical Exam     Patient Vitals for the past 24 hrs:   BP Temp Temp src Pulse Resp SpO2   02/10/22 0941 124/81 97.7  F (36.5  C) Oral 63 18 99 %       Physical Exam  General/Appearance: appears stated age, well-groomed, appears comfortable  MSK: ARREGUIN, good tone, no bony abnormality  Skin: warm and well-perfused, no rash, no edema, no ecchymosis, nl turgor, 5 sutures in R forearm  Heme: no petechia, no purpura, no active bleeding        Emergency Department Course   Procedures:  5 sutures removed from R forearm    Emergency Department Course:     Reviewed:  I reviewed nursing notes, vitals and past medical history    Assessments:  1000 I obtained history and examined the patient in ED 18 as noted above.     Disposition:  The patient was discharged to home.     Impression & Plan     Medical Decision Making:  This patient is a 31-year-old male who lacerated his forearm on the 30th.  He presents today with suture removal.  5 sutures removed.  The wound looks good and is healing well without erythema, warmth, drainage.    Diagnosis:    ICD-10-CM    1. Visit for suture removal  Z48.02        Discharge Medications:  New Prescriptions    No medications on file       This note was completed in part using Dragon voice recognition software. Although reviewed after completion, some word and grammatical errors may  occur.              Adriane León MD  02/10/22 1001

## 2022-02-10 NOTE — ED TRIAGE NOTES
Patient presents for suture removal. Had sutures placed 1/30 on right forearm here in emergency department.

## 2022-03-17 ENCOUNTER — NURSE TRIAGE (OUTPATIENT)
Dept: NURSING | Facility: CLINIC | Age: 32
End: 2022-03-17
Payer: COMMERCIAL

## 2022-03-17 NOTE — TELEPHONE ENCOUNTER
Patient is requesting a refill for acid reflux medication Omeprazole.  Patient has tried over the counter Omeprazole and it is not working.  FNA advised to contact PCP [facility] and patient agrees.    COVID 19 Nurse Triage Plan/Patient Instructions    Please be aware that novel coronavirus (COVID-19) may be circulating in the community. If you develop symptoms such as fever, cough, or SOB or if you have concerns about the presence of another infection including coronavirus (COVID-19), please contact your health care provider or visit [email or web address].     Disposition/Instructions    Home care recommended. Follow home care protocol based instructions.    Thank you for taking steps to prevent the spread of this virus.  o Limit your contact with others.  o Wear a simple mask to cover your cough.  o Wash your hands well and often.    Resources    [facility]: About COVID-19: [email or web address]    CDC: What to Do If You're Sick: www.cdc.gov/coronavirus/2019-ncov/about/steps-when-sick.html    CDC: Ending Home Isolation: www.cdc.gov/coronavirus/2019-ncov/hcp/disposition-in-home-patients.html     CDC: Caring for Someone: www.cdc.gov/coronavirus/2019-ncov/if-you-are-sick/care-for-someone.html     [facility]: Interim Guidance for Hospital Discharge to Home: www.health.[facility].[place]/diseases/coronavirus/hcp/hospdischarge.pdf    [facility] clinical trials (COVID-19 research studies): [email or web address]     Below are the COVID-19 hotlines at the [facility] of Health ([facility]). Interpreters are available.   o For health questions: Call 701-486-4753 or 1-136.873.7378 (7 a.m. to 7 p.m.)  o For questions about schools and childcare: Call 195-130-8157 or 1-385.242.6396 (7 a.m. to 7 p.m.)

## 2022-03-21 ENCOUNTER — OFFICE VISIT (OUTPATIENT)
Dept: FAMILY MEDICINE | Facility: CLINIC | Age: 32
End: 2022-03-21
Payer: COMMERCIAL

## 2022-03-21 ENCOUNTER — MYC MEDICAL ADVICE (OUTPATIENT)
Dept: FAMILY MEDICINE | Facility: CLINIC | Age: 32
End: 2022-03-21

## 2022-03-21 VITALS
HEART RATE: 76 BPM | WEIGHT: 225.5 LBS | BODY MASS INDEX: 32.28 KG/M2 | SYSTOLIC BLOOD PRESSURE: 110 MMHG | DIASTOLIC BLOOD PRESSURE: 80 MMHG | HEIGHT: 70 IN

## 2022-03-21 DIAGNOSIS — F41.1 GAD (GENERALIZED ANXIETY DISORDER): ICD-10-CM

## 2022-03-21 DIAGNOSIS — K21.00 GASTROESOPHAGEAL REFLUX DISEASE WITH ESOPHAGITIS WITHOUT HEMORRHAGE: Primary | ICD-10-CM

## 2022-03-21 DIAGNOSIS — F41.1 GAD (GENERALIZED ANXIETY DISORDER): Primary | ICD-10-CM

## 2022-03-21 PROCEDURE — 99203 OFFICE O/P NEW LOW 30 MIN: CPT | Performed by: FAMILY MEDICINE

## 2022-03-21 RX ORDER — BUPROPION HYDROCHLORIDE 150 MG/1
150 TABLET ORAL EVERY MORNING
Qty: 90 TABLET | Refills: 4 | Status: SHIPPED | OUTPATIENT
Start: 2022-03-21 | End: 2022-03-22

## 2022-03-21 ASSESSMENT — ASTHMA QUESTIONNAIRES
QUESTION_5 LAST FOUR WEEKS HOW WOULD YOU RATE YOUR ASTHMA CONTROL: COMPLETELY CONTROLLED
ACT_TOTALSCORE: 25
QUESTION_2 LAST FOUR WEEKS HOW OFTEN HAVE YOU HAD SHORTNESS OF BREATH: NOT AT ALL
QUESTION_1 LAST FOUR WEEKS HOW MUCH OF THE TIME DID YOUR ASTHMA KEEP YOU FROM GETTING AS MUCH DONE AT WORK, SCHOOL OR AT HOME: NONE OF THE TIME
QUESTION_4 LAST FOUR WEEKS HOW OFTEN HAVE YOU USED YOUR RESCUE INHALER OR NEBULIZER MEDICATION (SUCH AS ALBUTEROL): NOT AT ALL
ACT_TOTALSCORE: 25
ACUTE_EXACERBATION_TODAY: NO
QUESTION_3 LAST FOUR WEEKS HOW OFTEN DID YOUR ASTHMA SYMPTOMS (WHEEZING, COUGHING, SHORTNESS OF BREATH, CHEST TIGHTNESS OR PAIN) WAKE YOU UP AT NIGHT OR EARLIER THAN USUAL IN THE MORNING: NOT AT ALL

## 2022-03-21 ASSESSMENT — ANXIETY QUESTIONNAIRES
7. FEELING AFRAID AS IF SOMETHING AWFUL MIGHT HAPPEN: SEVERAL DAYS
1. FEELING NERVOUS, ANXIOUS, OR ON EDGE: MORE THAN HALF THE DAYS
5. BEING SO RESTLESS THAT IT IS HARD TO SIT STILL: MORE THAN HALF THE DAYS
4. TROUBLE RELAXING: NEARLY EVERY DAY
GAD7 TOTAL SCORE: 13
6. BECOMING EASILY ANNOYED OR IRRITABLE: NOT AT ALL
IF YOU CHECKED OFF ANY PROBLEMS ON THIS QUESTIONNAIRE, HOW DIFFICULT HAVE THESE PROBLEMS MADE IT FOR YOU TO DO YOUR WORK, TAKE CARE OF THINGS AT HOME, OR GET ALONG WITH OTHER PEOPLE: SOMEWHAT DIFFICULT
2. NOT BEING ABLE TO STOP OR CONTROL WORRYING: MORE THAN HALF THE DAYS
3. WORRYING TOO MUCH ABOUT DIFFERENT THINGS: NEARLY EVERY DAY

## 2022-03-21 ASSESSMENT — PATIENT HEALTH QUESTIONNAIRE - PHQ9: SUM OF ALL RESPONSES TO PHQ QUESTIONS 1-9: 1

## 2022-03-21 NOTE — PATIENT INSTRUCTIONS
Patient Education     Anxiety Reaction  Anxiety is the feeling we all get when we think something bad might happen. It is a normal response to stress and normally causes only a mild reaction. When anxiety becomes more severe, it can interfere with daily life. In some cases, you may not even be aware of what you re anxious about. There may also be a genetic link. Or it may be a learned behavior in the home.   Both psychological and physical triggers cause stress reaction. It's often a response to fear or emotional stress, real or imagined. This stress may come from home, family, work, or social relationships.   During an anxiety reaction, you may feel:    Helpless    Nervous    Depressed    Grouchy  Your body may show signs of anxiety in many ways. You may experience:    Dry mouth    Shakiness    Dizziness    Weakness    Trouble breathing    Breathing fast (hyperventilating)    Chest pressure    Sweating    Headache    Nausea    Diarrhea    Tiredness    Inability to sleep    Sexual problems  Home care    Try to find the sources of stress in your life. They may not be obvious. These may include:  ? Daily hassles of life (such as traffic jams, missed appointments, or car troubles)  ? Major life changes, both good (new baby or job promotion) and bad (loss of job or loss of loved one)  ? Overload (feeling that you have too many responsibilities and can't take care of all of them at once)  ? Feeling helpless or feeling that your problems can't be solved    Notice how your body reacts to stress. Learn to listen to your body signals. This will help you take action before the stress becomes severe.    When you can, do something about the source of your stress. (Avoid hassles, limit the amount of change that happens in your life at one time, and take a break when you feel overloaded).    Unfortunately, many stressful situations can't be avoided. It is necessary to learn how to better manage stress. There are many proven  methods that will reduce your anxiety. These include simple things such as exercise, good nutrition, and adequate rest. Also, there are certain techniques that are helpful:  ? Relaxation  ? Breathing exercises  ? Visualization  ? Biofeedback  ? Meditation  For more information about this, talk with your healthcare provider. Or check online or at your local library or bookstore. You'll find many books and audiobooks on this subject.   Follow-up care  If you feel your anxiety is not responding to self-help measures, call your healthcare provider or make an appointment with a counselor. You may need short-term psychological counseling or medicine to help you manage stress.   Call 911  Call 911 if any of these happen:     Trouble breathing    Confusion    Drowsiness or trouble waking up    Fainting or loss of consciousness    Rapid heart rate    Seizure    New chest pain that becomes more severe, lasts longer, or spreads into your shoulder, arm, neck, jaw, or back  When to get medical advice  Call your healthcare provider right away if any of these happen:    Your symptoms get worse    Severe headache not eased by rest and mild pain reliever  [employer] last reviewed this educational content on 4/1/2020 2000-2021 [employer]. All rights reserved. This information is not intended as a substitute for professional medical care. Always follow your healthcare professional's instructions.           Patient Education     Treating Anxiety Disorders with Therapy    If you have an anxiety disorder, you don t have to suffer needlessly. Treatment is available. Therapy (also called counseling) is often a helpful treatment for anxiety disorders. With therapy, a trained professional (therapist) helps you face and learn to manage your anxiety. Therapy can be short-term or long-term based on your needs. In some cases, medicine may also be prescribed with therapy. It may take time before you notice how much therapy is  helping, but stick with it. With therapy, you can feel better.   Cognitive behavioral therapy (CBT)  Cognitive behavioral therapy (CBT) teaches you to manage anxiety. It does this by helping you understand how you think and act when you re anxious. Research has shown CBT to be a very effective treatment for anxiety disorders. CBT involves homework and activities to build skills that teach you to cope with anxiety step by step. It can be done in a group or 1-on-1, and often takes place for a set number of sessions. CBT has 2 main parts:     Cognitive therapy. This helps you identify the negative, irrational thoughts that occur with your anxiety. You ll learn to replace these with more positive, realistic thoughts.    Behavioral therapy. This helps you change how you react to anxiety. You ll learn coping skills and methods for relaxing to help you better deal with anxiety.  Other forms of therapy  Other therapy methods may work better for you than CBT. Or, you may move from CBT to another form of therapy as your treatment needs change. This may mean meeting with a therapist by yourself or in a group. Therapy can also help you work through problems in your life, such as drug or alcohol dependence, that may be making your anxiety worse.   Getting better takes time  Therapy will help you feel better and teach you skills to help manage anxiety long term. But change doesn t happen right away. It takes a commitment from you. And treatment only works if you learn to face the causes of your anxiety. So, you might feel worse before you feel better. This can sometimes make it hard to stick with it. But remember: Therapy is a very effective treatment. The results will be well worth it.   Helping yourself  If anxiety is wearing you down, here are some things you can do to cope:    Check with your healthcare provider and rule out any physical problems that may be causing the anxiety symptoms.    If you are diagnosed with an  anxiety disorder, seek mental healthcare. This is an illness and it can respond to treatment. Most types of anxiety disorders will respond to talk therapy and medicine.    Educate yourself about anxiety disorders. Keep track of helpful online resources and books you can use during stressful periods.    Try stress management methods such as meditation.    Consider online or in-person support groups.    Don t fight your feelings. Anxiety feeds itself. The more you worry about it, the worse it gets. Instead, try to identify what might have triggered your anxiety. Then try to put this threat in perspective.    Keep in mind that you can t control everything about a situation. Change what you can and let the rest take its course.    Exercise -- it s a great way to relieve tension and help your body feel relaxed.    Examine your life for stress, and try to find ways to reduce it.    Avoid caffeine and nicotine. These can make anxiety symptoms worse.    Don't turn to alcohol or unprescribed medicines for relief. They only make things worse in the long run.  [employer] last reviewed this educational content on 5/1/2020 2000-2021 [employer]. All rights reserved. This information is not intended as a substitute for professional medical care. Always follow your healthcare professional's instructions.           Patient Education     GERD (Adult)    The esophagus is a tube that carries food from the mouth to the stomach. A valve (the LES, lower esophageal sphincter) at the lower end of the esophagus prevents stomach acid from flowing upward. When this valve doesn't work properly, stomach contents may repeatedly flow back up (reflux) into the esophagus. This is called gastroesophageal reflux disease (GERD). GERD can irritate the esophagus. It can cause problems with pain, swallowing or breathing. In severe cases, GERD can cause recurrent pneumonia (from aspiration or breathing in particles) or other serious  "problems.  Symptoms of reflux include burning, pressure or sharp pain in the upper abdomen or mid to lower chest. The pain can spread to the neck, back, or shoulder. There may be belching, an acid taste in the back of the throat, chronic cough, or sore throat, or hoarseness. GERD symptoms often occur during the day after a big meal. They can also occur at night when lying down.   Home care  Lifestyle changes can help reduce symptoms. If needed, your healthcare provider may prescribe medicines. Symptoms often improve with treatment, but if treatment is stopped, the symptoms often return after a few months. So most persons with GERD will need to continue treatment or get treatment on and off.  Lifestyle changes    Limit or avoid fatty, fried, and spicy foods, as well as coffee, chocolate, mint, and foods with high acid content such as tomatoes and citrus fruit and juices (orange, grapefruit, lemon).    Don t eat large meals, especially at night. Frequent, smaller meals are best. Don't lie down right after eating. And don t eat anything 3 hours before going to bed.    Don't drink alcohol or smoke. As much as possible, stay away from second hand smoke.    If you are overweight, losing weight will reduce symptoms.     Don't wear tight clothing around your stomach area.    If your symptoms occur during sleep, use a foam wedge to elevate your upper body (not just your head.) Or, place 4\" blocks under the head of your bed. Or use 2 bed risers under your bedframe.  Medicines  If needed, medicines can help relieve the symptoms of GERD and prevent damage to the esophagus. Discuss a medicine plan with your healthcare provider. This may include one or more of the following medicines:    Antacids to help neutralize the normal acids in your stomach.    Acid blockers (Histamine or H2 blockers) to decrease acid production.    Acid inhibitors (proton pump inhibitors PPIs) to decrease acid production in a different way than the " blockers. They may work better, but can take a little longer to take effect.  Take an antacid 30 to 60 minutes after eating and at bedtime, but not at the same time as an acid blocker.  Try not to take medicines such as ibuprofen and aspirin. If you are taking aspirin for your heart or other medical reasons, talk to your healthcare provider about stopping it.  Follow-up care  Follow up with your healthcare provider or as advised by our staff.  When to seek medical advice  Call your healthcare provider if any of the following occur:    Stomach pain gets worse or moves to the lower right abdomen (appendix area)    Chest pain appears or gets worse, or spreads to the back, neck, shoulder, or arm    An over-the-counter trial of medicine doesn't relieve your symptoms    Weight loss that can't be explained    Trouble or pain swallowing    Frequent vomiting (can t keep down liquids)    Blood in the stool or vomit (red or black in color)    Feeling weak or dizzy    Fever of 100.4 F (38 C) or higher, or as directed by your healthcare provider  [employer] last reviewed this educational content on 3/1/2018    8416-5794 [employer]. All rights reserved. This information is not intended as a substitute for professional medical care. Always follow your healthcare professional's instructions.

## 2022-03-21 NOTE — PROGRESS NOTES
Assessment/plan   Neymar Gilmore is a 31 year old male who is New    patient to my practice here with chief complaint     Patient presents with:  Establish Care  Gastrophageal Reflux: throat and chest irritation, heartburn has gotten better - taking otc 20mg omeprazole x4 days with some relief        Neymar was seen today for establish care and gastrophageal reflux.    Diagnoses and all orders for this visit:    Gastroesophageal reflux disease with esophagitis without hemorrhage    PAVAN (generalized anxiety disorder)  -     Mental Health Referral - Outpatient Treatment; Future    will also send the A prescription for Wellbutrin as he decided after the visit through [facility].  Will Follow up in 4-6 wk with preventive visit       Subjective:      HPI: Neymar Gilmore is a 31 year old male is here for.      GERD/Heartburn  Onset/Duration: 10 days ago   Description:woke up as chest irritation and sore throat   Intensity: moderate  Progression of Symptoms: intermittent and waxing and waning  Accompanying Signs & Symptoms:  Does it feel like food gets stuck or trouble swallowing: no  Nausea: no  Vomiting (bloody?): no  Abdominal Pain: no  Black-Tarry stools: no  Bloody stools: no  History:  Previous similar episodes: YES  Previous ulcers: no  Precipitating factors:   Caffeine use: YES  Alcohol use: no  NSAID/Aspirin use: YES ibuprofen  Tobacco use: YES but quite 6 wk ago   Worse with fatty foods, spicy foods and caffeinated drinks.  Alleviating factors: avoiding trigger , for last 5 days taking omeprazole 20 mg once day   Therapies tried and outcome:             Lifestyle changes: cut down smoking and caffeine             Medications: Omeprazole (Prilosec) which is helping     No flowsheet data found.    PHQ 3/21/2022   PHQ-9 Total Score 1   Q9: Thoughts of better off dead/self-harm past 2 weeks Not at all       Answers for HPI/ROS submitted by the patient on 3/21/2022  How many servings of fruits and vegetables do  you eat daily?: 0-1  On average, how many sweetened beverages do you drink each day (Examples: soda, juice, sweet tea, etc.  Do NOT count diet or artificially sweetened beverages)?: 3  How many minutes a day do you exercise enough to make your heart beat faster?: 9 or less  How many days a week do you exercise enough to make your heart beat faster?: 3 or less  How many days per week do you miss taking your medication?: 0  What is the reason for your visit today?: Acid reflux flare ups  When did your symptoms begin?: 1-2 weeks ago  What are your symptoms?: Throat irritation and a little chest irritation  How would you describe these symptoms?: Mild  Are your symptoms:: Improving  Have you had these symptoms before?: Yes  Have you tried or received treatment for these symptoms before?: Yes  Did that treatment work? : Yes  Please describe the treatment you had:: I was giving a prescription medication and it cleared up pretty fast  Is there anything that makes you feel worse?: Certain foods and sitting down seems to irritate it a little more  Is there anything that makes you feel better?: Ice cold water and tums as well        I have personally reviewed the patient's allergies, medications, past medical history, family history, social history, rooming notes and problem list in detail and updated the patient record as necessary.      Past Medical History:   Diagnosis Date     Gastroesophageal reflux disease      Uncomplicated asthma      No past surgical history on file.  Other environmental allergy  Current Outpatient Medications   Medication Sig Dispense Refill     omeprazole (PRILOSEC) 20 MG DR capsule Take 20 mg by mouth daily       buPROPion (WELLBUTRIN XL) 150 MG 24 hr tablet Take 1 tablet (150 mg) by mouth every morning 90 tablet 4     No family history on file.    Patient Active Problem List   Diagnosis     Exercise induced bronchospasm     Gastroesophageal reflux disease with esophagitis without hemorrhage      "PAVAN (generalized anxiety disorder)       Review of Systems   12 point comprehensive review of systems was negative except as noted and HPI     Social History     Social History Narrative     Not on file       Objective:     Vitals:    03/21/22 0905   BP: 110/80   Pulse: 76   Weight: 102.3 kg (225 lb 8 oz)   Height: 1.778 m (5' 10\")       Physical Exam:   Physical Exam:  General Appearance:  Appears comfortable, Alert, cooperative, no distress,   Head: Normocephalic, without obvious abnormality, atraumatic  Eyes: PERRL, conjunctiva/corneas clear, EOM's intact, both eyes             Nose: Nares normal, no drainage   Throat: Lips, mucosa, and tongue normal; teeth and gums normal  Neck: Supple, symmetrical, trachea midline, no adenopathy;                      Lungs: Clear to auscultation bilaterally, respirations unlabored  Heart: Regular rate and rhythm, S1 and S2 normal, no murmur, rubs or gallop  Extremities: Extremities normal, atraumatic, no cyanosis or edema  Pulses: DP pulses are 1-2+ bilat.    Skin: no rashes or lesions       35 minutes spent on the day of encounter doing chart review, history and exam, documentation, and further activities as noted.     This note has been dictated using voice recognition software. Any grammatical or context distortions are unintentional and inherent to the software    Mila Riley MD       "

## 2022-03-22 RX ORDER — BUPROPION HYDROCHLORIDE 150 MG/1
150 TABLET ORAL EVERY MORNING
Qty: 90 TABLET | Refills: 4 | Status: SHIPPED | OUTPATIENT
Start: 2022-03-22

## 2022-03-22 NOTE — TELEPHONE ENCOUNTER
Please resend prescription. Patient moved and was originally sent to wrong pharmacy.    Nathalie GIBBS CMA ([facility])

## 2022-03-23 ENCOUNTER — TELEPHONE (OUTPATIENT)
Dept: BEHAVIORAL HEALTH | Facility: CLINIC | Age: 32
End: 2022-03-23
Payer: COMMERCIAL

## 2022-03-24 ENCOUNTER — E-VISIT (OUTPATIENT)
Dept: FAMILY MEDICINE | Facility: CLINIC | Age: 32
End: 2022-03-24
Payer: COMMERCIAL

## 2022-03-24 ENCOUNTER — NURSE TRIAGE (OUTPATIENT)
Dept: NURSING | Facility: CLINIC | Age: 32
End: 2022-03-24
Payer: COMMERCIAL

## 2022-03-24 DIAGNOSIS — F41.1 GAD (GENERALIZED ANXIETY DISORDER): Primary | ICD-10-CM

## 2022-03-24 DIAGNOSIS — F41.0 PANIC ATTACK: ICD-10-CM

## 2022-03-24 PROCEDURE — 99207 PR NO CHARGE LOS: CPT | Performed by: FAMILY MEDICINE

## 2022-03-24 NOTE — TELEPHONE ENCOUNTER
"Pt calling to report his GERD is causing an \"annoying\" feeling in his chest \"not really pain, just annoying\". Pt states he does not think the GERD is helping and \"they pointed me more toward anxiety stuff but I don't think that's it\".     Advised pt recommendation would be do discuss further with PCP.     Pt is agreeable to plan, will schedule follow up appointment.     Reason for Disposition    [1] Follow-up call from patient regarding patient's clinical status AND [2] information NON-URGENT    Protocols used: PCP CALL - NO TRIAGE-A-AH      "

## 2022-03-25 ENCOUNTER — MYC MEDICAL ADVICE (OUTPATIENT)
Dept: FAMILY MEDICINE | Facility: CLINIC | Age: 32
End: 2022-03-25
Payer: COMMERCIAL

## 2022-03-25 RX ORDER — ALPRAZOLAM 0.5 MG
0.5 TABLET ORAL 3 TIMES DAILY PRN
Qty: 10 TABLET | Refills: 0 | Status: SHIPPED | OUTPATIENT
Start: 2022-03-25

## 2022-03-25 NOTE — PATIENT INSTRUCTIONS
I will recommend trying omeprazole twice daily  With Maalox or tums which is available Over the counter to help the symptoms as needed  Symptoms of GERD are flared up with underlying anxiety , continue bupropion as before to help anxiety  I will add quick acting anxiety medicine call Xanax ( start with 1/2 tab every 6 hr as needed )which you try to take when you feel the symptoms , if symptoms better that will also help understand that anxiety making things worse     Mila Riley MD 3/25/2022 8:35 AM   [facility].  [phone]

## 2022-03-26 ENCOUNTER — HEALTH MAINTENANCE LETTER (OUTPATIENT)
Age: 32
End: 2022-03-26

## 2022-09-11 ENCOUNTER — HOSPITAL ENCOUNTER (EMERGENCY)
Facility: CLINIC | Age: 32
Discharge: LEFT BEFORE DISCHARGE | End: 2022-09-11
Payer: COMMERCIAL

## 2022-09-18 ENCOUNTER — HEALTH MAINTENANCE LETTER (OUTPATIENT)
Age: 32
End: 2022-09-18

## 2023-04-24 ENCOUNTER — VIRTUAL VISIT (OUTPATIENT)
Dept: FAMILY MEDICINE | Facility: CLINIC | Age: 33
End: 2023-04-24
Payer: COMMERCIAL

## 2023-04-24 DIAGNOSIS — J01.90 ACUTE NON-RECURRENT SINUSITIS, UNSPECIFIED LOCATION: Primary | ICD-10-CM

## 2023-04-24 RX ORDER — ELASTIC BANDAGE 1"X2.2YD
1 BANDAGE TOPICAL 3 TIMES DAILY PRN
Qty: 30 PACKET | Refills: 2 | Status: SHIPPED | OUTPATIENT
Start: 2023-04-24

## 2023-04-24 RX ORDER — FLUTICASONE PROPIONATE 50 MCG
1 SPRAY, SUSPENSION (ML) NASAL DAILY
Qty: 16 G | Refills: 2 | Status: SHIPPED | OUTPATIENT
Start: 2023-04-24

## 2023-04-24 ASSESSMENT — ASTHMA QUESTIONNAIRES
QUESTION_4 LAST FOUR WEEKS HOW OFTEN HAVE YOU USED YOUR RESCUE INHALER OR NEBULIZER MEDICATION (SUCH AS ALBUTEROL): TWO OR THREE TIMES PER WEEK
ACT_TOTALSCORE: 18
QUESTION_5 LAST FOUR WEEKS HOW WOULD YOU RATE YOUR ASTHMA CONTROL: WELL CONTROLLED
QUESTION_3 LAST FOUR WEEKS HOW OFTEN DID YOUR ASTHMA SYMPTOMS (WHEEZING, COUGHING, SHORTNESS OF BREATH, CHEST TIGHTNESS OR PAIN) WAKE YOU UP AT NIGHT OR EARLIER THAN USUAL IN THE MORNING: ONCE OR TWICE
ACT_TOTALSCORE: 18
QUESTION_1 LAST FOUR WEEKS HOW MUCH OF THE TIME DID YOUR ASTHMA KEEP YOU FROM GETTING AS MUCH DONE AT WORK, SCHOOL OR AT HOME: A LITTLE OF THE TIME
QUESTION_2 LAST FOUR WEEKS HOW OFTEN HAVE YOU HAD SHORTNESS OF BREATH: THREE TO SIX TIMES A WEEK

## 2023-05-07 ENCOUNTER — HEALTH MAINTENANCE LETTER (OUTPATIENT)
Age: 33
End: 2023-05-07

## 2024-07-14 ENCOUNTER — HEALTH MAINTENANCE LETTER (OUTPATIENT)
Age: 34
End: 2024-07-14

## 2024-09-19 ENCOUNTER — PATIENT OUTREACH (OUTPATIENT)
Dept: CARE COORDINATION | Facility: CLINIC | Age: 34
End: 2024-09-19
Payer: COMMERCIAL

## 2024-09-19 NOTE — PROGRESS NOTES
Clinic Care Coordination Contact  Program:   [facility]: [place]    Renewal:[facility]   Date Applied:      GREG Outreach:   9/19/24:  CTA called to see if patient needed assistance with their [employer] Renewal. Patient declined needing assistance and no follow up needed   CTA WILL E-MAIL APPLICATION TO SARATH [facility]  Care   [facility]  Clinic Care Coordination  [phone]      Health Insurance:        Referral/Screening:

## 2025-07-19 ENCOUNTER — HEALTH MAINTENANCE LETTER (OUTPATIENT)
Age: 35
End: 2025-07-19